# Patient Record
(demographics unavailable — no encounter records)

---

## 2017-06-12 DIAGNOSIS — F41.0 PANIC ATTACKS: ICD-10-CM

## 2017-06-12 DIAGNOSIS — F41.1 GENERALIZED ANXIETY DISORDER: ICD-10-CM

## 2017-06-12 NOTE — TELEPHONE ENCOUNTER
ALPRAZolam (XANAX) 0.5 MG      Last Written Prescription Date:  4/11/16  Last Fill Quantity: 60,   # refills: 0  Last Office Visit with [facility] or [facility] prescribing provider: 3/1/16  Future Office visit:       Routing refill request to provider for review/approval because:  Drug not on the [facility], [facility] or [facility] refill protocol or controlled substance

## 2017-06-14 RX ORDER — ALPRAZOLAM 0.5 MG
0.5 TABLET ORAL 2 TIMES DAILY
Qty: 60 TABLET | Refills: 0 | OUTPATIENT
Start: 2017-06-14

## 2017-06-14 NOTE — TELEPHONE ENCOUNTER
Refill denied, routing to team to call patient and assist in scheduling.   Maria Del Carmen Montoya RN   [facility] - Triage

## 2017-06-14 NOTE — TELEPHONE ENCOUNTER
Pt called back stating he doesn't need this medication right now and that he didn't request it.    Chante Montoya CMA

## 2017-08-29 DIAGNOSIS — F41.0 PANIC ATTACKS: ICD-10-CM

## 2017-08-29 DIAGNOSIS — F41.1 GENERALIZED ANXIETY DISORDER: ICD-10-CM

## 2017-08-29 NOTE — TELEPHONE ENCOUNTER
Xanax      Last Written Prescription Date:  4/11/2016  Last Fill Quantity: 60,   # refills: 0  Last Office Visit with [facility] prescribing provider: 3/1/2016  Future Office visit:       Routing refill request to provider for review/approval because:  Drug not on the [facility] or [facility] refill protocol or controlled substance  Patient has not been seen in > 1 year  Betsy Coley RN - Triage  [facility]

## 2017-08-29 NOTE — LETTER
[place] [id number] [place] [address]   [phone]      September 13, 2017    Bo Leigh  [address] [place] [id number]            Dear Mr. Leigh    Your physician requires an office visit every 12 months in order to monitor your maintenance medication(s).  Your last office visit was on 4/2006.    Please call the clinic at [phone] to schedule an appointment..        Sincerely,    [facility]

## 2017-08-30 NOTE — TELEPHONE ENCOUNTER
It's been over a year since he has been seen. Needs to make an appointment before this medication can be refille.d

## 2017-09-07 NOTE — TELEPHONE ENCOUNTER
2nd attempt to contact pt and mailbox is still full unable to leave message.    Chante Montoya CMA

## 2017-09-13 RX ORDER — ALPRAZOLAM 0.5 MG
TABLET ORAL
Qty: 60 TABLET | Refills: 0 | OUTPATIENT
Start: 2017-09-13

## 2017-09-13 NOTE — TELEPHONE ENCOUNTER
3rd attempt to call pt mailbox is still full.;    Will mail letter.    Triage please close encounter.    Chante Montoya CMA